# Patient Record
Sex: FEMALE | Race: WHITE
[De-identification: names, ages, dates, MRNs, and addresses within clinical notes are randomized per-mention and may not be internally consistent; named-entity substitution may affect disease eponyms.]

---

## 2018-07-06 ENCOUNTER — HOSPITAL ENCOUNTER (EMERGENCY)
Dept: HOSPITAL 80 - FED | Age: 28
Discharge: HOME | End: 2018-07-06
Payer: COMMERCIAL

## 2018-07-06 VITALS — DIASTOLIC BLOOD PRESSURE: 78 MMHG | SYSTOLIC BLOOD PRESSURE: 112 MMHG

## 2018-07-06 DIAGNOSIS — R51: Primary | ICD-10-CM

## 2018-07-06 LAB — PLATELET # BLD: 350 10^3/UL (ref 150–400)

## 2018-07-06 NOTE — EDPHY
H & P


Stated Complaint: Headache, dizziness for 2 days.





- Personal History


LMP (Females 10-55): 8-14 Days Ago


Current Tetanus Diphtheria and Acellular Pertussis (TDAP): Yes





- Medical/Surgical History


Hx Asthma: No


Hx Chronic Respiratory Disease: No


Hx Diabetes: No


Hx Cardiac Disease: No


Hx Renal Disease: No


Hx Cirrhosis: No


Hx Alcoholism: No


Hx HIV/AIDS: No


Hx Splenectomy or Spleen Trauma: No


Other PMH: History of cystitis. Appy. Migraines





- Social History


Smoking Status: Never smoked


Time Seen by Provider: 07/06/18 09:16


HPI/ROS: 





CHIEF COMPLAINT:  Headache x2 days 





HISTORY OF PRESENT ILLNESS: 27-year-old female history of chronic migraine 

complaining of 2 days of right-sided headache, dizziness, photophobia.  No 

nausea or vomiting.  No trauma.  No gait instability.  No history of major or 

minor neck or head trauma or manipulation.  





PRIMARY CARE PROVIDER:David  





REVIEW OF SYSTEMS:


A ten point review of systems was performed and is negative with the exception 

of the items mentioned in the HPI








PAST MEDICAL & SURGICAL  HISTORY:  Chronic migraine.  No exogenous estrogen 

history.





SOCIAL HISTORY: Nonsmoker.  No tobacco use.  No drug use.    














************


PHYSICAL EXAM





(Prior to examination, patient consented to physical exam, hands were washed 

and my usual and customary physical exam procedures followed)


1) GENERAL: Well-developed, well-nourished, alert and oriented.  Appears to be 

in no acute distress.  Sitting in a darkened room


2) HEAD: Normocephalic, atraumatic


3) HEENT: Pupils equal, round, reactive to light bilaterally.  Sclera 

anicteric.  No nystagmus Nasopharynx, oropharynx, clear, no lesions.  Ears 

bilaterally with normal tympanic membranes.


4) NECK: Full range of motion, no meningeal signs.


5) LUNGS: Clear auscultation bilaterally, no wheezes, no rhonchi, no 

retractions.   


6) HEART: Regular rate and rhythm, no murmur, no heave, no gallop.


7) ABDOMEN: No guarding, no rebound, no focal tenderness, negative McBurney's, 

negative Henning's, negative Rovsing's, negative peritoneal sign,


8) MUSCULOSKELETAL: Moving all extremities, no focal areas of tenderness, no 

obvious trauma.  No peripheral edema or discoloration.


9) BACK: No CVA tenderness, no midline vertebral tenderness, no fluctuance, no 

step-off, no obvious trauma, no visual or palpable abnormality. 


10) SKIN: No rash, no petechiae. 


11) Psychiatric:  Patient is oriented X 3, there is no agitation.


12) NEURO: Awake, alert, and oriented to person, place and time.  Answers 

questions appropriately.  There were no obvious focal neurologic abnormalities.

  No cerebellar dysfunction.  Cranial nerves 2 through to 12 intact.  Normal 

steady gait.  Upper and lower extremities bilaterally with strength 5 / 5, 

reflexes 2+.





***************





DIFFERENTIAL DIAGNOSIS:   In no particular order, including but not limited to 

subarachnoid hemorrhage, migraine headache, tension headache and infectious 

causes such as meningitis, pharyngitis and sinusitis.  The patient understands 

that this diagnosis is provisional and can never be 100% accurate.  Usual and 

customary warnings were given concerning the clinical impression and all the 

patient's questions were answered.  The patient was instructed to return to the 

emergency department should her symptoms worsen or return, or develop any new 

symptoms, otherwise to followup as directed in discharge instructions. This is 

a partial list of diagnoses considered. These considerations are based on 

history, physical exam, past history and reassessment.


 (FABIOLA Abreu)


Constitutional: 





 Initial Vital Signs











Temperature (C)  36.6 C   07/06/18 08:36


 


Heart Rate  71   07/06/18 08:36


 


Respiratory Rate  18   07/06/18 08:36


 


Blood Pressure  121/79 H  07/06/18 08:36


 


O2 Sat (%)  97   07/06/18 08:36








 











O2 Delivery Mode               Room Air














Allergies/Adverse Reactions: 


 





No Known Allergies Allergy (Verified 10/27/16 14:22)


 








Home Medications: 














 Medication  Instructions  Recorded


 


Cyclobenzaprine [Flexeril 10 MG 10 mg PO TID #15 tab 10/27/16





(RX)]  














Medical Decision Making


ED Course/Re-evaluation: 





930 a.m.:  I saw this patient independently based on established practice 

protocols.  Care of patient under supervision of secondary supervising 

physician Dr Felder.  This patient has a nonfocal exam, no red flag 

neurologic signs or symptoms.  History of chronic headache.  At this time will 

hold on imaging studies.





10:44 a.m.:  Re-evaluation after medication administration feeling "much better

".  She would like to be discharged home.  I think that subarachnoid hemorrhage

, intracranial mass, malignancy, less than likely in this patient at this time.

  I do not think that the benefits of CT imaging, lumbar puncture, outweigh the 

risks in this patient.  She feels comfortable being discharged and is 

requesting discharge. (FABIOLA Abreu)


Other Provider: 





The patient was evaluated and managed by the Physician Assistant.  My co-

signature indicates that I have reviewed this chart and I agree with the 

findings and plan of care as documented.  I am the secondary supervising 

physician. (Ashley Felder)





- Data Points


Laboratory Results: 





 Laboratory Results





 07/06/18 09:40 





 07/06/18 09:40 








Medications Given: 





 








Discontinued Medications





Dexamethasone (Decadron Injection)  8 mg IVP EDNOW ONE


   Stop: 07/06/18 09:29


   Last Admin: 07/06/18 09:40 Dose:  8 mg


Diazepam (Valium)  5 mg IVP EDNOW ONE


   Stop: 07/06/18 09:29


   Last Admin: 07/06/18 09:41 Dose:  5 mg


Ketorolac Tromethamine (Toradol)  15 mg IVP/IM EDNOW ONE


   Stop: 07/06/18 09:29


   Last Admin: 07/06/18 09:40 Dose:  15 mg


Metoclopramide HCl (Reglan Injection)  10 mg IVP EDNOW ONE


   Stop: 07/06/18 09:29


   Last Admin: 07/06/18 09:41 Dose:  10 mg








Departure





- Departure


Disposition: Home, Routine, Self-Care


Clinical Impression: 


 Headache





Condition: Good


Instructions:  Acute Headache (ED)


Additional Instructions: 


THANK YOU FOR YOUR VISIT TO OUR EMERGENCY DEPARTMENT (ED).  YOU WERE SEEN TODAY 

BECAUSE OF A HEADACHE.  YOU MAY HAVE HAD LAB TESTS, A CT SCAN, MRI OR EVEN A 

LUMBAR PUNCTURE (COMMONLY REFERRED TO AS A SPINAL TAP).   WE CANNOT ALWAYS FIND 

THE EXACT CAUSE OF YOUR SYMPTOMS DURING YOUR VISIT TO THE ED.  PLEASE FOLLOW UP 

WITH YOUR DOCTOR WITHIN 24 HOURS TO BE RECHECKED.  RETURN TO THE ED IMMEDIATELY 

IF YOUR HEADACHE WORSENS, IF YOU DEVELOP A FEVER, NECK PAIN OR NECK STIFFNESS, 

OR IF YOU BECOME CONFUSED OR ABNORMALLY DROWSY.


Referrals: 


[facility] ,. [Edm Groups for Call Sched] - 1-2 days without fail